# Patient Record
Sex: MALE | Race: WHITE | Employment: FULL TIME | URBAN - METROPOLITAN AREA
[De-identification: names, ages, dates, MRNs, and addresses within clinical notes are randomized per-mention and may not be internally consistent; named-entity substitution may affect disease eponyms.]

---

## 2023-08-23 ENCOUNTER — OFFICE VISIT (OUTPATIENT)
Age: 33
End: 2023-08-23

## 2023-08-23 ENCOUNTER — TELEPHONE (OUTPATIENT)
Age: 33
End: 2023-08-23

## 2023-08-23 VITALS
WEIGHT: 153 LBS | SYSTOLIC BLOOD PRESSURE: 118 MMHG | DIASTOLIC BLOOD PRESSURE: 80 MMHG | HEART RATE: 66 BPM | HEIGHT: 65 IN | BODY MASS INDEX: 25.49 KG/M2 | OXYGEN SATURATION: 96 %

## 2023-08-23 DIAGNOSIS — R00.2 HEART PALPITATIONS: Primary | ICD-10-CM

## 2023-08-23 DIAGNOSIS — E78.2 MIXED HYPERLIPIDEMIA: ICD-10-CM

## 2023-08-23 DIAGNOSIS — R07.9 CHEST PAIN, UNSPECIFIED TYPE: ICD-10-CM

## 2023-08-23 RX ORDER — ATORVASTATIN CALCIUM 10 MG/1
10 TABLET, FILM COATED ORAL DAILY
COMMUNITY

## 2023-08-23 ASSESSMENT — PATIENT HEALTH QUESTIONNAIRE - PHQ9
2. FEELING DOWN, DEPRESSED OR HOPELESS: 0
SUM OF ALL RESPONSES TO PHQ9 QUESTIONS 1 & 2: 0
SUM OF ALL RESPONSES TO PHQ QUESTIONS 1-9: 0
1. LITTLE INTEREST OR PLEASURE IN DOING THINGS: 0

## 2023-08-23 NOTE — TELEPHONE ENCOUNTER
Enrolled with [employer] - Ordered and being shipped to patient's home address on file. ETA within 5-7 business days. Loop monitor. Received:  Today  Claudene Alice, RN  Noni Schuler  Please order a 30 day loop monitor for palpitations per Dr. Mirna Cheung.     Thanks,      John Lomeli

## 2023-08-23 NOTE — PATIENT INSTRUCTIONS
A 30 day loop monitor has been ordered for you. This will be mailed to the address given to us. Please read over the instructions given to you about [employer] loop monitors. Save the pre-paid box so that you can use it to mail monitor back to company. Your heart rate and rhythm will be monitored continuously and our office will be notified regarding any concerns. If you have any insurance questions regarding coverage and out of pocket cost please contact the number on the instructions - [phone]. You will be scheduled for a stress test after your appointment today. Please wear comfortable clothing (shorts or pants with a shirt or blouse) and walking/athletic shoes.   Do not eat or drink anything, except water, for at least 2 hours prior to your test.  Do take your scheduled medications prior to your test.

## 2023-09-05 ENCOUNTER — TELEPHONE (OUTPATIENT)
Age: 33
End: 2023-09-05

## 2023-09-05 NOTE — TELEPHONE ENCOUNTER
----- Message from Noe Nguyen MD sent at 9/1/2023 11:57 AM EDT -----  Please let pt know stress test was normal. thx

## 2023-10-24 ENCOUNTER — TELEPHONE (OUTPATIENT)
Age: 33
End: 2023-10-24

## 2023-10-24 NOTE — TELEPHONE ENCOUNTER
----- Message from Gemma Moulton MD sent at 10/24/2023 12:48 AM EDT -----  Please let pt know monitor was overall normal. thx

## 2023-10-24 NOTE — TELEPHONE ENCOUNTER
Attempted to reach patient by telephone. A message was left for return call. Sent a [employer] message to patient with results.

## 2025-02-01 NOTE — TELEPHONE ENCOUNTER
Attempted to reach patient by telephone. A message was left for return call. Sent a [employer]. [Initial Visit ___] : [unfilled] is here today for an initial visit  for [unfilled]